# Patient Record
Sex: FEMALE | Race: BLACK OR AFRICAN AMERICAN | Employment: PART TIME | RURAL
[De-identification: names, ages, dates, MRNs, and addresses within clinical notes are randomized per-mention and may not be internally consistent; named-entity substitution may affect disease eponyms.]

---

## 2019-01-03 ENCOUNTER — OFFICE VISIT (OUTPATIENT)
Dept: FAMILY MEDICINE CLINIC | Age: 29
End: 2019-01-03

## 2019-01-03 VITALS
HEART RATE: 96 BPM | RESPIRATION RATE: 20 BRPM | BODY MASS INDEX: 25.3 KG/M2 | SYSTOLIC BLOOD PRESSURE: 130 MMHG | HEIGHT: 64 IN | TEMPERATURE: 97 F | DIASTOLIC BLOOD PRESSURE: 76 MMHG | WEIGHT: 148.2 LBS | OXYGEN SATURATION: 97 %

## 2019-01-03 DIAGNOSIS — R53.83 FATIGUE, UNSPECIFIED TYPE: ICD-10-CM

## 2019-01-03 DIAGNOSIS — R59.9 LYMPH NODE ENLARGEMENT: ICD-10-CM

## 2019-01-03 DIAGNOSIS — J18.9 COMMUNITY ACQUIRED PNEUMONIA, UNSPECIFIED LATERALITY: Primary | ICD-10-CM

## 2019-01-03 RX ORDER — BISMUTH SUBSALICYLATE 262 MG
1 TABLET,CHEWABLE ORAL DAILY
COMMUNITY
Start: 2019-01-03 | End: 2019-12-06

## 2019-01-03 RX ORDER — AZITHROMYCIN 250 MG/1
TABLET, FILM COATED ORAL
Qty: 6 TAB | Refills: 0 | Status: SHIPPED | OUTPATIENT
Start: 2019-01-03 | End: 2019-01-08

## 2019-01-03 NOTE — PROGRESS NOTES
Santiago Marrero  25 y.o. female  1990  [address] [facility]  [id number]     [facility] Practice: Progress Note       Encounter Date: 1/3/2019    Chief Complaint   Patient presents with   [facility] Follow Up     pneumonia, symptoms persist (SOB, chest pain, difficulty swallowing)     History of Present Illness   Santiago Marrero is a 25 y.o. female who presents to clinic today with her significant other for:    Positive Flu A & B-12/01/2018-[facility] (completed Tamiflu). She states for a week she felt ok then started feeling fatigue and achy. Last week CP and hard to breath, numbness in right hand and sharp pain in right foot. Right hand dominant. [facility] 12/27/2018-Cxray, urinalysis, blood work-dx with pneumonia. Given Clarithromycin 500mg BID for 7 days and a albuterol inhaler (Q4-6 hours). States initially the albuterol helps; she reports some wheezing. This AM she still feels achy, \"lump in her throat\" difficulty swallowing, feels like she \"can sleep all week\". Appetite normal and tolerating fluids. Also sick contact exposure during the holidays. LMP 1/1/2019. Health Maintenance    Health Maintenance Due   Topic Date Due    DTaP/Tdap/Td series (1 - Tdap) 06/23/2009    PAP AKA CERVICAL CYTOLOGY  06/23/2009    Influenza Age 5 to Adult  08/01/2018     Review of Systems   Review of Systems   HENT: Positive for sore throat. Eyes: Negative. Respiratory: Positive for shortness of breath. Cardiovascular: Negative. Gastrointestinal: Negative. Genitourinary: Negative. Musculoskeletal: Negative. Skin: Negative. Neurological: Positive for weakness. Endo/Heme/Allergies: Negative. Psychiatric/Behavioral: Negative.         Vitals/Objective:     Vitals:    01/03/19 0921   BP: 130/76   Pulse: 96   Resp: 20   Temp: 97 °F (36.1 °C)   TempSrc: Oral   SpO2: 97%   Weight: 148 lb 3.2 oz (67.2 kg)   Height: 5' 4\" (1.626 m)     Body mass index is 25.44 kg/m². Physical Exam   Constitutional: She is oriented to person, place, and time. She is cooperative. HENT:   Head: Normocephalic. Nose: Nose normal.   Mouth/Throat: Uvula is midline and mucous membranes are normal. Posterior oropharyngeal erythema present. Eyes: Conjunctivae and lids are normal. Pupils are equal, round, and reactive to light. Neck: Trachea normal, normal range of motion, full passive range of motion without pain and phonation normal. Neck supple. No thyromegaly present. Cardiovascular: Normal rate, regular rhythm, normal heart sounds and normal pulses. Pulmonary/Chest: Effort normal and breath sounds normal.   Abdominal: Bowel sounds are normal.   Musculoskeletal: Normal range of motion. Lymphadenopathy:     She has cervical adenopathy. Right cervical: Superficial cervical adenopathy present. Neurological: She is alert and oriented to person, place, and time. Skin: Skin is warm, dry and intact. Psychiatric: She has a normal mood and affect. Her speech is normal and behavior is normal. Judgment and thought content normal. Cognition and memory are normal.       No results found for this or any previous visit (from the past 24 hour(s)). Assessment and Plan:   1. Community acquired pneumonia, unspecified laterality    - azithromycin (ZITHROMAX) 250 mg tablet; Take 2 tablets today, then take 1 tablet daily  Dispense: 6 Tab; Refill: 0  - XR CHEST PA LAT; Future    2. Lymph node enlargement      3. Fatigue, unspecified type    Discussed supportive therapy-continued rest, fluids, inhaler as needed, mucinex DM BID for the next 7 days. Strict ED parameters discussed. Patient to return to clinic 1/4/2019 for f/u chest xray. Will order diflucan if needed for antibiotic induced yeast infection. I have discussed the diagnosis with the patient and the intended plan as seen in the above orders. she has expressed understanding.   The patient has received an after-visit summary and questions were answered concerning future plans. I have discussed medication side effects and warnings with the patient as well. Electronically Signed: Dragan Rodriguez NP     History/Allergies   Patients past medical, surgical and family histories were reviewed and updated. History reviewed. No pertinent past medical history. Past Surgical History:   Procedure Laterality Date    HX HEENT  2013    wisdom teeth      Family History   Problem Relation Age of Onset    Elevated Lipids Mother     Diabetes Paternal Grandmother     No Known Problems Father      Social History     Socioeconomic History    Marital status: SINGLE     Spouse name: Not on file    Number of children: Not on file    Years of education: Not on file    Highest education level: Not on file   Social Needs    Financial resource strain: Not on file    Food insecurity - worry: Not on file    Food insecurity - inability: Not on file   [employer] needs - medical: Not on file   [employer] needs - non-medical: Not on file   Occupational History    Not on file   Tobacco Use    Smoking status: Never Smoker    Smokeless tobacco: Never Used   Substance and Sexual Activity    Alcohol use: Yes     Alcohol/week: 0.0 oz     Comment: socially    Drug use: No    Sexual activity: No   Other Topics Concern    Not on file   Social History Narrative    Not on file         Allergies   Allergen Reactions    Amoxicillin Hives       Disposition     Follow-up Disposition: Not on File    No future appointments. Current Medications after this visit     Current Outpatient Medications   Medication Sig    multivitamin (ONE A DAY) tablet Take 1 Tab by mouth daily.  azithromycin (ZITHROMAX) 250 mg tablet Take 2 tablets today, then take 1 tablet daily     No current facility-administered medications for this visit.       Medications Discontinued During This Encounter   Medication Reason    promethazine-codeine (PHENERGAN-CODEINE) 6.25-10 mg/5 mL syrup Therapy Completed

## 2019-01-03 NOTE — PROGRESS NOTES
Chief Complaint   Patient presents with   [facility] Follow Up     pneumonia, symptoms persist (SOB, chest pain, difficulty swallowing)     Visit Vitals  /76 (BP 1 Location: Right arm, BP Patient Position: Sitting)   Pulse 96   Temp 97 °F (36.1 °C) (Oral)   Resp 20   Ht 5' 4\" (1.626 m)   Wt 148 lb 3.2 oz (67.2 kg)   LMP 12/26/2018 (Approximate)   SpO2 97%   BMI 25.44 kg/m²     1. Have you been to the ER, urgent care clinic since your last visit? Hospitalized since your last visit? Yes [facility] and [facility]    2. Have you seen or consulted any other health care providers outside of the [address] [place] [address] since your last visit? Include any pap smears or colon screening.  No    Reviewed record in preparation for visit and have necessary documentation  Pt did not bring medication to office visit for review  opportunity was given for questions  Goals that were addressed and/or need to be completed during or after this appointment include   Health Maintenance Due   Topic Date Due    DTaP/Tdap/Td series (1 - Tdap) 06/23/2009    PAP AKA CERVICAL CYTOLOGY  06/23/2009    Influenza Age 9 to Adult  08/01/2018

## 2019-05-31 ENCOUNTER — ED HISTORICAL/CONVERTED ENCOUNTER (OUTPATIENT)
Dept: OTHER | Age: 29
End: 2019-05-31

## 2019-06-26 NOTE — PATIENT INSTRUCTIONS
Pneumonia: Care Instructions  Your Care Instructions    Pneumonia is an infection of the lungs. Most cases are caused by infections from bacteria or viruses. Pneumonia may be mild or very severe. If it is caused by bacteria, you will be treated with antibiotics. It may take a few weeks to a few months to recover fully from pneumonia, depending on how sick you were and whether your overall health is good. Follow-up care is a key part of your treatment and safety. Be sure to make and go to all appointments, and call your doctor if you are having problems. It's also a good idea to know your test results and keep a list of the medicines you take. How can you care for yourself at home? · Take your antibiotics exactly as directed. Do not stop taking the medicine just because you are feeling better. You need to take the full course of antibiotics. · Take your medicines exactly as prescribed. Call your doctor if you think you are having a problem with your medicine. · Get plenty of rest and sleep. You may feel weak and tired for a while, but your energy level will improve with time. · To prevent dehydration, drink plenty of fluids, enough so that your urine is light yellow or clear like water. Choose water and other caffeine-free clear liquids until you feel better. If you have kidney, heart, or liver disease and have to limit fluids, talk with your doctor before you increase the amount of fluids you drink. · Take care of your cough so you can rest. A cough that brings up mucus from your lungs is common with pneumonia. It is one way your body gets rid of the infection. But if coughing keeps you from resting or causes severe fatigue and chest-wall pain, talk to your doctor. He or she may suggest that you take a medicine to reduce the cough. · Use a vaporizer or humidifier to add moisture to your bedroom. Follow the directions for cleaning the machine. · Do not smoke or allow others to smoke around you.  Smoke will make your cough last longer. If you need help quitting, talk to your doctor about stop-smoking programs and medicines. These can increase your chances of quitting for good. · Take an over-the-counter pain medicine, such as acetaminophen (Tylenol), ibuprofen (Advil, Motrin), or naproxen (Aleve). Read and follow all instructions on the label. · Do not take two or more pain medicines at the same time unless the doctor told you to. Many pain medicines have acetaminophen, which is Tylenol. Too much acetaminophen (Tylenol) can be harmful. · If you were given a spirometer to measure how well your lungs are working, use it as instructed. This can help your doctor tell how your recovery is going. · To prevent pneumonia in the future, talk to your doctor about getting a flu vaccine (once a year) and a pneumococcal vaccine (one time only for most people). When should you call for help? Call 911 anytime you think you may need emergency care. For example, call if:    · You have severe trouble breathing.    Call your doctor now or seek immediate medical care if:    · You cough up dark brown or bloody mucus (sputum).     · You have new or worse trouble breathing.     · You are dizzy or lightheaded, or you feel like you may faint.    Watch closely for changes in your health, and be sure to contact your doctor if:    · You have a new or higher fever.     · You are coughing more deeply or more often.     · You are not getting better after 2 days (48 hours).     · You do not get better as expected. Where can you learn more? Go to http://reny-mickie.info/. Enter [id number] in the search box to learn more about \"Pneumonia: Care Instructions. \"  Current as of: December 6, 2017  Content Version: 11.8  © 0004-2335 [employer]. Care instructions adapted under license by [employer] (which disclaims liability or warranty for this information).  If you have questions about a medical condition or this instruction, always ask your healthcare professional. [place] [address] any warranty or liability for your use of this information. Mucinex DM twice a day for the next 7 days. peg replaced by ir.  appreciate assistance.  appreciate id

## 2019-12-06 ENCOUNTER — OFFICE VISIT (OUTPATIENT)
Dept: FAMILY MEDICINE CLINIC | Age: 29
End: 2019-12-06

## 2019-12-06 VITALS
SYSTOLIC BLOOD PRESSURE: 119 MMHG | RESPIRATION RATE: 16 BRPM | BODY MASS INDEX: 25.1 KG/M2 | HEART RATE: 106 BPM | OXYGEN SATURATION: 98 % | DIASTOLIC BLOOD PRESSURE: 73 MMHG | HEIGHT: 64 IN | WEIGHT: 147 LBS | TEMPERATURE: 98.9 F

## 2019-12-06 DIAGNOSIS — J06.9 VIRAL URI: Primary | ICD-10-CM

## 2019-12-06 NOTE — PATIENT INSTRUCTIONS
Viral Respiratory Infection: Care Instructions Your Care Instructions Viruses are very small organisms. They grow in number after they enter your body. There are many types that cause different illnesses, such as colds and the mumps. The symptoms of a viral respiratory infection often start quickly. They include a fever, sore throat, and runny nose. You may also just not feel well. Or you may not want to eat much. Most viral respiratory infections are not serious. They usually get better with time and self-care. Antibiotics are not used to treat a viral infection. That's because antibiotics will not help cure a viral illness. In some cases, antiviral medicine can help your body fight a serious viral infection. Follow-up care is a key part of your treatment and safety. Be sure to make and go to all appointments, and call your doctor if you are having problems. It's also a good idea to know your test results and keep a list of the medicines you take. How can you care for yourself at home? · Rest as much as possible until you feel better. · Be safe with medicines. Take your medicine exactly as prescribed. Call your doctor if you think you are having a problem with your medicine. You will get more details on the specific medicine your doctor prescribes. · Take an over-the-counter pain medicine, such as acetaminophen (Tylenol), ibuprofen (Advil, Motrin), or naproxen (Aleve), as needed for pain and fever. Read and follow all instructions on the label. Do not give aspirin to anyone younger than 20. It has been linked to Reye syndrome, a serious illness. · Drink plenty of fluids, enough so that your urine is light yellow or clear like water. Hot fluids, such as tea or soup, may help relieve congestion in your nose and throat. If you have kidney, heart, or liver disease and have to limit fluids, talk with your doctor before you increase the amount of fluids you drink. · Try to clear mucus from your lungs by breathing deeply and coughing. · Gargle with warm salt water once an hour. This can help reduce swelling and throat pain. Use 1 teaspoon of salt mixed in 1 cup of warm water. · Do not smoke or allow others to smoke around you. If you need help quitting, talk to your doctor about stop-smoking programs and medicines. These can increase your chances of quitting for good. To avoid spreading the virus · Cough or sneeze into a tissue. Then throw the tissue away. · If you don't have a tissue, use your hand to cover your cough or sneeze. Then clean your hand. You can also cough into your sleeve. · Wash your hands often. Use soap and warm water. Wash for 15 to 20 seconds each time. · If you don't have soap and water near you, you can clean your hands with alcohol wipes or gel. When should you call for help? Call your doctor now or seek immediate medical care if: 
  · You have a new or higher fever.  
  · Your fever lasts more than 48 hours.  
  · You have trouble breathing.  
  · You have a fever with a stiff neck or a severe headache.  
  · You are sensitive to light.  
  · You feel very sleepy or confused.  
 Watch closely for changes in your health, and be sure to contact your doctor if: 
  · You do not get better as expected. Where can you learn more? Go to http://reny-mickie.info/. Enter J878 in the search box to learn more about \"Viral Respiratory Infection: Care Instructions. \" Current as of: June 9, 2019 Content Version: 12.2 © 0197-6898 [employer]. Care instructions adapted under license by [employer] (which disclaims liability or warranty for this information). If you have questions about a medical condition or this instruction, always ask your healthcare professional. [address] any warranty or liability for your use of this information.

## 2019-12-06 NOTE — PROGRESS NOTES
1. Have you been to the ER, urgent care clinic since your last visit? Hospitalized since your last visit? Yes  Health Maintenance Due   Topic Date Due    DTaP/Tdap/Td series (1 - Tdap) 06/23/1999    PAP AKA CERVICAL CYTOLOGY  06/23/2009    Influenza Age 9 to Adult  08/01/2019       2. Have you seen or consulted any other health care providers outside of the [address] [place] [address] since your last visit? Include any pap smears or colon screening. yes  Reviewed record in preparation for visit and have obtained necessary documentation. Patient did not bring medications to visit for review. Information provided on Advanced Directive, Living Will. Body mass index is 25.23 kg/m².

## 2019-12-06 NOTE — PROGRESS NOTES
CC: Congestion    HPI: Pt is a 30 y.o. female who presents for congestion. Associated with fever, fatigue, body aches and ear pain. Started yesterday with scratchy throat and has been worsening. She has tried Tylenol last night which broke the fever but she has not taken it since last night. 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 Mom has also been sick with sinus symptoms. History reviewed. No pertinent past medical history. Family History   Problem Relation Age of Onset    Elevated Lipids Mother     Diabetes Paternal Grandmother     No Known Problems Father        Social History     Tobacco Use    Smoking status: Never Smoker    Smokeless tobacco: Never Used   Substance Use Topics    Alcohol use: Yes     Alcohol/week: 0.0 standard drinks     Comment: socially    Drug use: No       ROS:  Per HPI    PE:  Visit Vitals  /73 (BP 1 Location: Left arm, BP Patient Position: Sitting)   Pulse (!) 106   Temp 98.9 °F (37.2 °C) (Oral)   Resp 16   Ht 5' 4\" (1.626 m)   Wt 147 lb (66.7 kg)   SpO2 98%   BMI 25.23 kg/m²     Gen: Pt sitting in chair, in NAD  Head: Normocephalic, atraumatic  Eyes: Sclera anicteric, EOM grossly intact, PERRL  Ears: TM's pearly with good light reflex b/l  Throat: MMM, normal lips, tongue and gums. No pharyngeal erythema or exudate. Neck: Supple, no LAD  CVS: Normal S1, S2, no m/r/g  Resp: CTAB, no wheezes or rales. Good air movement throughout. Extrem: Atraumatic, no cyanosis or edema  Pulses: 2+   Skin: Warm, dry  Neuro: Alert, oriented, appropriate      A/P:   Encounter Diagnoses     ICD-10-CM ICD-9-CM   1. Viral URI J06.9 465.9     1. Viral URI  - Discussed with pt that she likely has a viral URI similar to what her nephew had, although presentation is less severe in adults.  Advised supportive care with tylenol prn, warm liquids with honey and rest. She will call back in 5 days if she is not feeling any better or worsening       RTC prn if symptoms worsen or fail to improve      Discussed diagnoses in detail with patient. Medication risks/benefits/side effects discussed with patient. All of the patient's questions were addressed. The patient understands and agrees with our plan of care. The patient knows to call back if they are unsure of or forget any changes we discussed today or if the symptoms change. The patient received an After-Visit Summary which contains VS, orders, medication list and allergy list. This can be used as a \"mini-medical record\" should they have to seek medical care while out of town. No current outpatient medications on file prior to visit. No current facility-administered medications on file prior to visit.

## 2019-12-26 ENCOUNTER — HOSPITAL ENCOUNTER (OUTPATIENT)
Dept: LAB | Age: 29
Discharge: HOME | End: 2019-12-26

## 2019-12-26 ENCOUNTER — OFFICE VISIT (OUTPATIENT)
Dept: FAMILY MEDICINE CLINIC | Age: 29
End: 2019-12-26

## 2019-12-26 ENCOUNTER — TELEPHONE (OUTPATIENT)
Dept: FAMILY MEDICINE CLINIC | Age: 29
End: 2019-12-26

## 2019-12-26 VITALS
BODY MASS INDEX: 25.68 KG/M2 | OXYGEN SATURATION: 97 % | HEART RATE: 82 BPM | TEMPERATURE: 97.2 F | WEIGHT: 150.4 LBS | RESPIRATION RATE: 18 BRPM | SYSTOLIC BLOOD PRESSURE: 111 MMHG | HEIGHT: 64 IN | DIASTOLIC BLOOD PRESSURE: 69 MMHG

## 2019-12-26 DIAGNOSIS — R10.32 LEFT LOWER QUADRANT ABDOMINAL PAIN: ICD-10-CM

## 2019-12-26 DIAGNOSIS — R10.32 LEFT LOWER QUADRANT ABDOMINAL PAIN: Primary | ICD-10-CM

## 2019-12-26 LAB
ALBUMIN SERPL-MCNC: 4.1 G/DL (ref 3.5–5)
ALBUMIN/GLOB SERPL: 1.3 {RATIO} (ref 1.1–2.2)
ALP SERPL-CCNC: 73 U/L (ref 45–117)
ALT SERPL-CCNC: 19 U/L (ref 12–78)
ANION GAP SERPL CALC-SCNC: 6 MMOL/L (ref 5–15)
AST SERPL-CCNC: 13 U/L (ref 15–37)
BASOPHILS # BLD: 0 K/UL (ref 0–0.1)
BASOPHILS NFR BLD: 0 % (ref 0–1)
BILIRUB SERPL-MCNC: 1.2 MG/DL (ref 0.2–1)
BUN SERPL-MCNC: 8 MG/DL (ref 6–20)
BUN/CREAT SERPL: 11 (ref 12–20)
CALCIUM SERPL-MCNC: 9.2 MG/DL (ref 8.5–10.1)
CHLORIDE SERPL-SCNC: 109 MMOL/L (ref 97–108)
CO2 SERPL-SCNC: 25 MMOL/L (ref 21–32)
CREAT SERPL-MCNC: 0.7 MG/DL (ref 0.55–1.02)
DIFFERENTIAL METHOD BLD: ABNORMAL
EOSINOPHIL # BLD: 0.1 K/UL (ref 0–0.4)
EOSINOPHIL NFR BLD: 2 % (ref 0–7)
ERYTHROCYTE [DISTWIDTH] IN BLOOD BY AUTOMATED COUNT: 18.6 % (ref 11.5–14.5)
ERYTHROCYTE [SEDIMENTATION RATE] IN BLOOD: 12 MM/HR (ref 0–20)
GLOBULIN SER CALC-MCNC: 3.2 G/DL (ref 2–4)
GLUCOSE SERPL-MCNC: 84 MG/DL (ref 65–100)
HCT VFR BLD AUTO: 36.4 % (ref 35–47)
HGB BLD-MCNC: 11.5 G/DL (ref 11.5–16)
IMM GRANULOCYTES # BLD AUTO: 0 K/UL (ref 0–0.04)
IMM GRANULOCYTES NFR BLD AUTO: 0 % (ref 0–0.5)
LYMPHOCYTES # BLD: 2.5 K/UL (ref 0.8–3.5)
LYMPHOCYTES NFR BLD: 40 % (ref 12–49)
MCH RBC QN AUTO: 23.8 PG (ref 26–34)
MCHC RBC AUTO-ENTMCNC: 31.6 G/DL (ref 30–36.5)
MCV RBC AUTO: 75.4 FL (ref 80–99)
MONOCYTES # BLD: 0.2 K/UL (ref 0–1)
MONOCYTES NFR BLD: 4 % (ref 5–13)
NEUTS SEG # BLD: 3.3 K/UL (ref 1.8–8)
NEUTS SEG NFR BLD: 54 % (ref 32–75)
NRBC # BLD: 0 K/UL (ref 0–0.01)
NRBC BLD-RTO: 0 PER 100 WBC
PLATELET # BLD AUTO: 280 K/UL (ref 150–400)
PMV BLD AUTO: 12.5 FL (ref 8.9–12.9)
POTASSIUM SERPL-SCNC: 3.8 MMOL/L (ref 3.5–5.1)
PROT SERPL-MCNC: 7.3 G/DL (ref 6.4–8.2)
RBC # BLD AUTO: 4.83 M/UL (ref 3.8–5.2)
SODIUM SERPL-SCNC: 140 MMOL/L (ref 136–145)
WBC # BLD AUTO: 6.1 K/UL (ref 3.6–11)

## 2019-12-26 RX ORDER — PANTOPRAZOLE SODIUM 40 MG/1
40 TABLET, DELAYED RELEASE ORAL DAILY
Qty: 30 TAB | Refills: 0 | Status: SHIPPED | OUTPATIENT
Start: 2019-12-26

## 2019-12-26 NOTE — PROGRESS NOTES
Chief Complaint   Patient presents with    Abdominal Pain     lower left xfew months     Visit Vitals  /69 (BP 1 Location: Right arm, BP Patient Position: Sitting)   Pulse 82   Temp 97.2 °F (36.2 °C) (Oral)   Resp 18   Ht 5' 4\" (1.626 m)   Wt 150 lb 6.4 oz (68.2 kg)   LMP 11/26/2019 (Approximate)   SpO2 97%   BMI 25.82 kg/m²     1. Have you been to the ER, urgent care clinic since your last visit? Hospitalized since your last visit? No    2. Have you seen or consulted any other health care providers outside of the [address] [place] [address] since your last visit? Include any pap smears or colon screening.  No    Reviewed record in preparation for visit and have necessary documentation  Pt did not bring medication to office visit for review  opportunity was given for questions  Goals that were addressed and/or need to be completed during or after this appointment include   Health Maintenance Due   Topic Date Due    DTaP/Tdap/Td series (1 - Tdap) 06/23/1999    PAP AKA CERVICAL CYTOLOGY  06/23/2009    Influenza Age 9 to Adult  08/01/2019

## 2019-12-26 NOTE — PROGRESS NOTES
Avril Graham  30 y.o. female  1990  [address]  [id number]     [facility]: Progress Note       Encounter Date: 12/26/2019    Chief Complaint   Patient presents with    Abdominal Pain     lower left xfew months     History of Present Illness   Avril Graham is a 30 y.o. female who presents to clinic today for:    Abdominal Pain- reports lower left abdominal pain that comes and goes. States over the past few weeks pain is more noticeable and worsening. Describes pain as \"gripping\" with or without food. Pain wakes her up out of her sleep. LMP end of November; not sexually active. Denies STI exposure or any gyn symptoms. Denies unintentional weight loss. Last bowel movement was this AM; states over the last couple of weeks she is having 3-4 bowel movements a day. Denies blood and mucus in bowel movement, N, V. Reports some diarrhea-loose. Denies-UTI symptoms and incontinence of bowel and bladder. No change in diet or routine-eating meat and vegetables; denies GERD symptoms. Some diary-cheese. Denies rash or anaphylaxis. States she had a bottle water yesterday and noted a \"clinching\" after drinking the water. No OTC treatment. Works as a  and is planning a wedding out of state; however denies being stressed. Father with hx of non cancerous polyps. Health Maintenance    Health Maintenance Due   Topic Date Due    DTaP/Tdap/Td series (1 - Tdap) 06/23/1999    PAP AKA CERVICAL CYTOLOGY  06/23/2009    Influenza Age 5 to Adult  08/01/2019     Review of Systems   Review of Systems   Constitutional: Negative. HENT: Negative. Eyes: Negative. Respiratory: Negative. Cardiovascular: Negative. Gastrointestinal: Positive for abdominal pain and diarrhea. Genitourinary: Negative. Musculoskeletal: Negative. Skin: Negative. Neurological: Negative. Endo/Heme/Allergies: Negative. Psychiatric/Behavioral: Negative. Vitals/Objective:     Vitals:    12/26/19 1021   BP: 111/69   Pulse: 82   Resp: 18   Temp: 97.2 °F (36.2 °C)   TempSrc: Oral   SpO2: 97%   Weight: 150 lb 6.4 oz (68.2 kg)   Height: 5' 4\" (1.626 m)     Body mass index is 25.82 kg/m². Physical Exam  Cardiovascular:      Rate and Rhythm: Normal rate and regular rhythm. Pulses: Normal pulses. Heart sounds: Normal heart sounds. Pulmonary:      Effort: Pulmonary effort is normal.      Breath sounds: Normal breath sounds and air entry. Abdominal:      General: Abdomen is flat. Bowel sounds are normal.      Palpations: Abdomen is soft. Tenderness: There is tenderness in the left lower quadrant. There is no left CVA tenderness. Hernia: No hernia is present. Musculoskeletal:      Right lower leg: No edema. Left lower leg: No edema. Skin:     General: Skin is warm and dry. Capillary Refill: Capillary refill takes less than 2 seconds. Neurological:      Mental Status: She is alert and oriented to person, place, and time. Psychiatric:         Attention and Perception: Attention normal.         Mood and Affect: Mood normal.         Speech: Speech normal.         Behavior: Behavior is cooperative. Thought Content: Thought content normal.         Cognition and Memory: Cognition normal.         Judgment: Judgment normal.         No results found for this or any previous visit (from the past 24 hour(s)). Assessment and Plan:   1. Left lower quadrant abdominal pain    - US ABD LTD; Future  - CELIAC DISEASE PROFILE (REFLEX TTG, IGG); Future  - CBC WITH AUTOMATED DIFF; Future  - SED RATE (ESR); Future  - METABOLIC PANEL, COMPREHENSIVE; Future  - pantoprazole (PROTONIX) 40 mg tablet; Take 1 Tab by mouth daily. Dispense: 30 Tab; Refill: 0    Diff dx-diverticulosis, IBS, celiac. Awaiting labs and ultrasound results. Xray for a KUB is not available in clinic today. Discussed strict ED parameters for worsening symptoms.     I have discussed the diagnosis with the patient and the intended plan as seen in the above orders. she has expressed understanding. The patient has received an after-visit summary and questions were answered concerning future plans. I have discussed medication side effects and warnings with the patient as well. Electronically Signed: Kris Dubon NP     History/Allergies   Patients past medical, surgical and family histories were reviewed and updated. History reviewed. No pertinent past medical history. Past Surgical History:   Procedure Laterality Date    HX HEENT  2013    wisdom teeth      Family History   Problem Relation Age of Onset    Elevated Lipids Mother     Diabetes Paternal Grandmother     No Known Problems Father      Social History     Socioeconomic History    Marital status: SINGLE     Spouse name: Not on file    Number of children: Not on file    Years of education: Not on file    Highest education level: Not on file   Occupational History    Not on file   Social Needs    Financial resource strain: Not on file    Food insecurity:     Worry: Not on file     Inability: Not on file    Transportation needs:     Medical: Not on file     Non-medical: Not on file   Tobacco Use    Smoking status: Never Smoker    Smokeless tobacco: Never Used   Substance and Sexual Activity    Alcohol use:  Yes     Alcohol/week: 0.0 standard drinks     Comment: socially    Drug use: No    Sexual activity: Never   Lifestyle    Physical activity:     Days per week: Not on file     Minutes per session: Not on file    Stress: Not on file   Relationships    Social connections:     Talks on phone: Not on file     Gets together: Not on file     Attends Spiritism service: Not on file     Active member of club or organization: Not on file     Attends meetings of clubs or organizations: Not on file     Relationship status: Not on file    Intimate partner violence:     Fear of current or ex partner: Not on file     Emotionally abused: Not on file     Physically abused: Not on file     Forced sexual activity: Not on file   Other Topics Concern    Not on file   Social History Narrative    Not on file         Allergies   Allergen Reactions    Amoxicillin Hives       Disposition     Follow-up and Dispositions  ·   Return if symptoms worsen or fail to improve. Future Appointments   Date Time Provider Geovanna Hess   1/2/2020 10:00 AM [facility]            Current Medications after this visit     Current Outpatient Medications   Medication Sig    pantoprazole (PROTONIX) 40 mg tablet Take 1 Tab by mouth daily. No current facility-administered medications for this visit. There are no discontinued medications.

## 2019-12-26 NOTE — PATIENT INSTRUCTIONS
Abdominal Pain: Care Instructions Your Care Instructions Abdominal pain has many possible causes. Some aren't serious and get better on their own in a few days. Others need more testing and treatment. If your pain continues or gets worse, you need to be rechecked and may need more tests to find out what is wrong. You may need surgery to correct the problem. Don't ignore new symptoms, such as fever, nausea and vomiting, urination problems, pain that gets worse, and dizziness. These may be signs of a more serious problem. Your doctor may have recommended a follow-up visit in the next 8 to 12 hours. If you are not getting better, you may need more tests or treatment. The doctor has checked you carefully, but problems can develop later. If you notice any problems or new symptoms, get medical treatment right away. Follow-up care is a key part of your treatment and safety. Be sure to make and go to all appointments, and call your doctor if you are having problems. It's also a good idea to know your test results and keep a list of the medicines you take. How can you care for yourself at home? · Rest until you feel better. · To prevent dehydration, drink plenty of fluids, enough so that your urine is light yellow or clear like water. Choose water and other caffeine-free clear liquids until you feel better. If you have kidney, heart, or liver disease and have to limit fluids, talk with your doctor before you increase the amount of fluids you drink. · If your stomach is upset, eat mild foods, such as rice, dry toast or crackers, bananas, and applesauce. Try eating several small meals instead of two or three large ones. · Wait until 48 hours after all symptoms have gone away before you have spicy foods, alcohol, and drinks that contain caffeine. · Do not eat foods that are high in fat.  
· Avoid anti-inflammatory medicines such as aspirin, ibuprofen (Advil, Motrin), and naproxen (Aleve). These can cause stomach upset. Talk to your doctor if you take daily aspirin for another health problem. When should you call for help? Call 911 anytime you think you may need emergency care. For example, call if: 
  · You passed out (lost consciousness).  
  · You pass maroon or very bloody stools.  
  · You vomit blood or what looks like coffee grounds.  
  · You have new, severe belly pain.  
 Call your doctor now or seek immediate medical care if: 
  · Your pain gets worse, especially if it becomes focused in one area of your belly.  
  · You have a new or higher fever.  
  · Your stools are black and look like tar, or they have streaks of blood.  
  · You have unexpected vaginal bleeding.  
  · You have symptoms of a urinary tract infection. These may include: 
? Pain when you urinate. ? Urinating more often than usual. 
? Blood in your urine.  
  · You are dizzy or lightheaded, or you feel like you may faint.  
 Watch closely for changes in your health, and be sure to contact your doctor if: 
  · You are not getting better after 1 day (24 hours). Where can you learn more? Go to http://reny-mickie.info/. Enter A575 in the search box to learn more about \"Abdominal Pain: Care Instructions. \" Current as of: June 26, 2019 Content Version: 12.2 © 1128-5877 [employer]. Care instructions adapted under license by [employer] (which disclaims liability or warranty for this information). If you have questions about a medical condition or this instruction, always ask your healthcare professional. [place] [address] any warranty or liability for your use of this information. Frequent Abdominal Pain: Care Instructions Your Care Instructions Frequent abdominal pain means you have belly pain that occurs at least 3 times over 3 months.  Sometimes the pain is linked to eating certain foods or having a bowel movement. But most of the time the pain cannot be explained. Your doctor may use the words \"functional abdominal pain\" or \"recurrent abdominal pain\" to describe the problem. It can be hard to deal with pain when your doctor cannot find a cause, even after tests are done. When the pain is very bad, it can keep you from doing your normal activities. Sometimes stress can make your pain worse. Even if you cannot make the pain go away, there are things you can do to make it a little easier to manage. Follow-up care is a key part of your treatment and safety. Be sure to make and go to all appointments, and call your doctor if you are having problems. It's also a good idea to know your test results and keep a list of the medicines you take. How can you care for yourself at home? · Keep a symptom diary. This can help you see if there are events or emotions that make your pain worse. Think about what you ate, drank, or felt before the pain began. Maybe pain comes after a stressful meeting or when you have spicy foods, dairy products, or alcohol. · Reduce stress. Breathing exercises and relaxation techniques can help. Try taking a walk or doing other exercise when you feel stressed. · Try cognitive-behavioral therapy. You can work with a counselor to learn how to do this therapy. It can help you cope with pain by changing how you think. It can help you notice discouraging thoughts that make you feel bad. When should you call for help? Call your doctor now or seek immediate medical care if: 
  · You have a fever and belly pain.  
  · You have severe pain that is different from your usual belly pain.  
 Watch closely for changes in your health, and be sure to contact your doctor if: 
  · Your pattern of pain changes.  
  · You have questions or concerns about your belly pain. Where can you learn more? Go to http://reny-mickie.info/. Enter T930 in the search box to learn more about \"Frequent Abdominal Pain: Care Instructions. \" Current as of: June 26, 2019 Content Version: 12.2 © 0385-4603 [employer]. Care instructions adapted under license by [employer] (which disclaims liability or warranty for this information). If you have questions about a medical condition or this instruction, always ask your healthcare professional. [place] [address] any warranty or liability for your use of this information.

## 2019-12-28 LAB
ENDOMYSIUM IGA SER QL: NEGATIVE
IGA SERPL-MCNC: 169 MG/DL (ref 87–352)
TTG IGA SER-ACNC: <2 U/ML (ref 0–3)

## 2019-12-31 ENCOUNTER — TELEPHONE (OUTPATIENT)
Dept: FAMILY MEDICINE CLINIC | Age: 29
End: 2019-12-31

## 2019-12-31 NOTE — TELEPHONE ENCOUNTER
Patient called per NP:  Please inform Ms. Sarah James of negative celiac screening and acceptable labs. Lab letter sent. Patient verbalized understanding and appreciative.

## 2020-01-02 ENCOUNTER — HOSPITAL ENCOUNTER (OUTPATIENT)
Dept: ULTRASOUND IMAGING | Age: 30
Discharge: HOME | End: 2020-01-02
Attending: NURSE PRACTITIONER
Payer: COMMERCIAL

## 2020-01-02 DIAGNOSIS — R10.32 LEFT LOWER QUADRANT ABDOMINAL PAIN: ICD-10-CM

## 2020-01-02 PROCEDURE — 76700 US EXAM ABDOM COMPLETE: CPT

## 2020-01-03 ENCOUNTER — TELEPHONE (OUTPATIENT)
Dept: FAMILY MEDICINE CLINIC | Age: 30
End: 2020-01-03

## 2020-01-03 DIAGNOSIS — G89.29 CHRONIC ABDOMINAL PAIN: Primary | ICD-10-CM

## 2020-01-03 DIAGNOSIS — R10.9 CHRONIC ABDOMINAL PAIN: Primary | ICD-10-CM

## 2020-01-03 NOTE — TELEPHONE ENCOUNTER
Returned phone call to patient. Read her the following lab letter from [facility]:    \"Normal imaging. Please call our office if you are still experiencing abdominal pain and we can order a referral for you to speak with a GI specialist.\"    Patient verbalized understanding and states that she is still having abdominal discomfort.  She would like for us to place a GI referral.

## 2020-01-08 ENCOUNTER — TELEPHONE (OUTPATIENT)
Dept: FAMILY MEDICINE CLINIC | Age: 30
End: 2020-01-08

## 2020-01-08 NOTE — TELEPHONE ENCOUNTER
----- Message from Ravi Vidal sent at 1/8/2020 11:06 AM EST -----  Regarding: Joshua/willis  Pt is requesting a recommendation to a GI doctor. Pts number is [phone].

## 2020-01-08 NOTE — TELEPHONE ENCOUNTER
Patient called and informed of GI referral ordered on 01/06 and once everything is finalized, someone will contact her. Patient verbalized understanding and appreciative.